# Patient Record
Sex: FEMALE | URBAN - METROPOLITAN AREA
[De-identification: names, ages, dates, MRNs, and addresses within clinical notes are randomized per-mention and may not be internally consistent; named-entity substitution may affect disease eponyms.]

---

## 2024-06-19 ENCOUNTER — MEDICAL CORRESPONDENCE (OUTPATIENT)
Dept: HEALTH INFORMATION MANAGEMENT | Facility: CLINIC | Age: 49
End: 2024-06-19
Payer: COMMERCIAL

## 2024-06-20 ENCOUNTER — TRANSCRIBE ORDERS (OUTPATIENT)
Dept: OTHER | Age: 49
End: 2024-06-20

## 2024-06-20 DIAGNOSIS — K75.4 AUTOIMMUNE HEPATITIS (H): Primary | ICD-10-CM

## 2024-06-24 ENCOUNTER — TELEPHONE (OUTPATIENT)
Dept: GASTROENTEROLOGY | Facility: CLINIC | Age: 49
End: 2024-06-24

## 2024-06-24 ENCOUNTER — OFFICE VISIT (OUTPATIENT)
Dept: GASTROENTEROLOGY | Facility: CLINIC | Age: 49
End: 2024-06-24
Attending: PHYSICIAN ASSISTANT
Payer: COMMERCIAL

## 2024-06-24 VITALS
BODY MASS INDEX: 31.94 KG/M2 | HEART RATE: 79 BPM | OXYGEN SATURATION: 98 % | WEIGHT: 191.7 LBS | DIASTOLIC BLOOD PRESSURE: 78 MMHG | SYSTOLIC BLOOD PRESSURE: 113 MMHG | HEIGHT: 65 IN

## 2024-06-24 DIAGNOSIS — K75.4 AUTOIMMUNE HEPATITIS (H): Primary | ICD-10-CM

## 2024-06-24 DIAGNOSIS — K75.4 AUTOIMMUNE HEPATITIS (H): ICD-10-CM

## 2024-06-24 DIAGNOSIS — R79.89 ELEVATED LFTS: Primary | ICD-10-CM

## 2024-06-24 LAB
ALBUMIN SERPL BCG-MCNC: 4.5 G/DL (ref 3.5–5.2)
ALP SERPL-CCNC: 97 U/L (ref 40–150)
ALT SERPL W P-5'-P-CCNC: 51 U/L (ref 0–50)
AST SERPL W P-5'-P-CCNC: 69 U/L (ref 0–45)
BILIRUB DIRECT SERPL-MCNC: 0.21 MG/DL (ref 0–0.3)
BILIRUB SERPL-MCNC: 0.8 MG/DL
PROT SERPL-MCNC: 8.7 G/DL (ref 6.4–8.3)

## 2024-06-24 PROCEDURE — 80076 HEPATIC FUNCTION PANEL: CPT | Performed by: STUDENT IN AN ORGANIZED HEALTH CARE EDUCATION/TRAINING PROGRAM

## 2024-06-24 PROCEDURE — 99000 SPECIMEN HANDLING OFFICE-LAB: CPT | Performed by: STUDENT IN AN ORGANIZED HEALTH CARE EDUCATION/TRAINING PROGRAM

## 2024-06-24 PROCEDURE — 36415 COLL VENOUS BLD VENIPUNCTURE: CPT | Performed by: STUDENT IN AN ORGANIZED HEALTH CARE EDUCATION/TRAINING PROGRAM

## 2024-06-24 PROCEDURE — 99204 OFFICE O/P NEW MOD 45 MIN: CPT | Performed by: STUDENT IN AN ORGANIZED HEALTH CARE EDUCATION/TRAINING PROGRAM

## 2024-06-24 PROCEDURE — 86364 TISS TRNSGLTMNASE EA IG CLAS: CPT | Performed by: STUDENT IN AN ORGANIZED HEALTH CARE EDUCATION/TRAINING PROGRAM

## 2024-06-24 PROCEDURE — 86790 VIRUS ANTIBODY NOS: CPT | Mod: 90 | Performed by: STUDENT IN AN ORGANIZED HEALTH CARE EDUCATION/TRAINING PROGRAM

## 2024-06-24 RX ORDER — BUDESONIDE 3 MG/1
6 CAPSULE, COATED PELLETS ORAL
COMMUNITY
Start: 2024-06-12 | End: 2024-09-10

## 2024-06-24 RX ORDER — CALCIUM CARBONATE/VITAMIN D3 600 MG-10
1 TABLET ORAL EVERY MORNING
COMMUNITY

## 2024-06-24 RX ORDER — AZATHIOPRINE 50 MG/1
200 TABLET ORAL
COMMUNITY
Start: 2024-04-26

## 2024-06-24 RX ORDER — HYDROXYCHLOROQUINE SULFATE 200 MG/1
400 TABLET, FILM COATED ORAL
COMMUNITY
Start: 2024-06-10 | End: 2024-09-08

## 2024-06-24 RX ORDER — LEVOTHYROXINE SODIUM 100 UG/1
100 TABLET ORAL
COMMUNITY
Start: 2023-09-19 | End: 2024-09-18

## 2024-06-24 ASSESSMENT — PAIN SCALES - GENERAL: PAINLEVEL: NO PAIN (0)

## 2024-06-24 NOTE — PROGRESS NOTES
[facility] New Patient Visit    Date of Visit: June 24, 2024    Reason for referral: autoimmune hepatitis    Subjective: Ms. Sousa is a 49 year old woman with a history of having hepatitis who presents for evaluation of elevated liver enzymes.    In 2011 she presented with jaundice and was found to have significantly elevated liver enzymes.  She had a liver biopsy that showed autoimmune hepatitis intranasal features.  She had 3-4 fibrosis at that time.  She was placed on Imuran and prednisone and reports she was weaned off the prednisone within a year.    She had been doing well until January 2023 when her AST and ALT increased to the 2-3 100s.  She was put on prednisone 40 mg daily and repeat labs in a month later were normal, with the taper she had mild liver enzyme elevation so she was put on budesonide 9 mg daily.  They have not been able to wean her down 6 mg daily.  In May she had metabolites checked and her Imuran was increased to 200 mg daily.  She has not had a repeat biopsy.  Her ultrasounds have been normal without any signs of fibrosis or steatosis.  She does not drink alcohol.  Does not have risk factors for metabolic liver disease.    She has an inflammatory arthritis that she is on Plaquenil for.  She has gained weight on steroids and does not like taking them.    ROS: 14 point ROS negative except for positives noted in HPI.    PMHx:  Autoimmune hepatitis  Inflammatory arthritis    PSHx:  None  Liver Bx    FamHx:  No family history of liver disease, liver cancer  Thyroid issues  Brother with bone marrow issue    SocHx:  Social History     Socioeconomic History    Marital status: Patient Declined     Spouse name: Not on file    Number of children: Not on file    Years of education: Not on file    Highest education level: Not on file   Occupational History    Not on file   Tobacco Use    Smoking status: Every Day     Types: Cigarettes    Smokeless tobacco: Never   Substance and  Sexual Activity    Alcohol use: Not Currently    Drug use: Never    Sexual activity: Not on file   Other Topics Concern    Not on file   Social History Narrative    Not on file     Social Determinants of Health     Financial Resource Strain: Low Risk  (9/12/2023)    Received from [facility]    Overall Financial Resource Strain (CARDIA)     Difficulty of Paying Living Expenses: Not hard at all   Food Insecurity: No Food Insecurity (9/12/2023)    Received from [facility]    Hunger Vital Sign     Worried About Running Out of Food in the Last Year: Never true     Ran Out of Food in the Last Year: Never true   Transportation Needs: No Transportation Needs (9/12/2023)    Received from [facility]    PRAPARE - Transportation     Lack of Transportation (Medical): No     Lack of Transportation (Non-Medical): No   Physical Activity: Sufficiently Active (9/12/2023)    Received from [facility]    Exercise Vital Sign     Days of Exercise per Week: 5 days     Minutes of Exercise per Session: 60 min   Stress: Stress Concern Present (9/12/2023)    Received from [facility]    [employer] [place] [employer] - Occupational Stress Questionnaire     Feeling of Stress : To some extent   Social Connections: Moderately Isolated (9/12/2023)    Received from [facility]    Social Connection and Isolation Panel [NHANES]     Frequency of Communication with Friends and Family: More than three times a week     Frequency of Social Gatherings with Friends and Family: Once a week     Attends Roman [employer] Services: Never     Active Member of Clubs or Organizations: No     Attends Club or Organization Meetings: Never     Marital Status:    Interpersonal Safety: Not At Risk (5/22/2024)    Received from [facility]    Intimate Partner Violence     Are you in a relationship where you are  "physically hurt, threatened and/or made to feel afraid?: No   Housing Stability: Unknown (9/12/2023)    Received from [facility]    Housing Stability Vital Sign     Unable to Pay for Housing in the Last Year: No     Number of Times Moved in the Last Year: Not on file     Homeless in the Last Year: Not on file   Lives at home with  and step kids (14, 18)  Adult children (25, 27)   No alcohol    Medications:   azaTHIOprine (IMURAN) 50 mg oral Tablet Take 3 Tablets (150 mg) by mouth in the morning.    b complex vitamins (VITAMIN B COMPLEX) oral Tablet Take 1 Tablet by mouth in the morning.    budesonide (ENTOCORT EC) 3 mg oral Capsule, Delayed & Ext.Release Take 2 Capsules (6 mg) by mouth in the morning.    calcium carbonate-vitamin D3 (AKA: CALTRATE-600+D) 600 mg(1,500mg) -400 unit oral Tablet Take 1 Tablet by mouth in the morning.    diclofenac sodium (VOLTAREN) 1 % topical Gel 2 g by topical route in the morning and 2 g in the evening. Apply to affected joint..    hydroxychloroquine (PLAQUENIL) 200 mg oral Tablet Take 1 Tablet (200 mg) by mouth in the morning and 1 Tablet (200 mg) in the evening.    levothyroxine (LEVOXYL,SYNTHROID) 100 mcg oral Tablet Take 1 Tablet (100 mcg) by mouth daily before breakfast.    multivitamin therapeutic oral Tablet Take 1 Tablet by mouth in the morning.     No OTCs, herbals    Allergies:  Allergies   Allergen Reactions    Penicillins Rash    Latex Rash       Objective:  /78 (BP Location: Left arm, Patient Position: Sitting, Cuff Size: Adult Regular)   Pulse 79   Ht 1.651 m (5' 5\")   Wt 87 kg (191 lb 11.2 oz)   SpO2 98%   BMI 31.90 kg/m    Constitutional: pleasant woman in NAD  Eyes: non icteric  Respiratory: Normal respiratory excursion   MSK: normal range of motion of visualized extremities  Abd: Non distended  Skin: No jaundice  Psychiatric: normal mood and orientation    Labs:  Last Comprehensive Metabolic Panel:  Bilirubin Total   Date " "Value Ref Range Status   06/24/2024 0.8 <=1.2 mg/dL Final     Alkaline Phosphatase   Date Value Ref Range Status   06/24/2024 97 40 - 150 U/L Final     ALT   Date Value Ref Range Status   06/24/2024 51 (H) 0 - 50 U/L Final     Comment:     Reference intervals for this test were updated on 6/12/2023 to more accurately reflect our healthy population. There may be differences in the flagging of prior results with similar values performed with this method. Interpretation of those prior results can be made in the context of the updated reference intervals.       AST   Date Value Ref Range Status   06/24/2024 69 (H) 0 - 45 U/L Final     Comment:     Reference intervals for this test were updated on 6/12/2023 to more accurately reflect our healthy population. There may be differences in the flagging of prior results with similar values performed with this method. Interpretation of those prior results can be made in the context of the updated reference intervals.       No results found for: \"WBC\"  No results found for: \"RBC\"  No results found for: \"HGB\"  No results found for: \"HCT\"  No results found for: \"MCV\"  No results found for: \"MCH\"  No results found for: \"MCHC\"  No results found for: \"RDW\"  No results found for: \"PLT\"    No results found for: \"INR\"     Computed MELD 3.0 unavailable. One or more values for this score either were not found within the given timeframe or did not fit some other criterion.  Computed MELD-Na unavailable. One or more values for this score either were not found within the given timeframe or did not fit some other criterion.    Liver Bx 2011    DIAGNOSIS:   LIVER, NEEDLE BIOPSY        --SEVERELY ACTIVE (GRADE 4 OF 4) CHRONIC HEPATITIS WITH GIANT          CELL TRANSFORMATION, CHOLESTASIS, PERICELLULAR FIBROSIS AND          BRIDGING FIBROSIS (STAGE 3 OF 4), FAVOR AUTOIMMUNE HEPATITIS          (SEE COMMENT)       Imaging:    RUQ US 6/2024    FINDINGS: Liver is normal in size with no definite " contour abnormality to indicate cirrhosis.   Hepatic echogenicity is normal. No liver mass seen. Portal vein patent with normal direction of    flow.     Normal gallbladder. No gallstones or changes of cholecystitis. Normal bile ducts. Common duct   measures 4 mm.     Normal pancreas.     Normal right kidney.     No adenopathy or free fluid seen in the right upper abdomen.     IMPRESSION:     1.  Negative examination. No evidence of hepatocellular cancer. The liver is not clearly   abnormal.     Independently reviewed labs and imaging.     Assessment/Plan: Ms. Galvez is a 49-year-old with a history of biopsy-proven autoimmune hepatitis with giant cell features, bridging fibrosis in 2011.  She presents for consultation of elevated liver enzymes despite treatment for autoimmune hepatitis.  Recommend ruling out other causes of liver enzyme elevation including celiac disease and hepatitis E given her immunosuppression.  Would recommend she get a repeat liver biopsy, this to be done at [facility].  Based on this may need to switch her to CellCept to try to get her on a steroid sparing regimen.  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  Discussed indications for liver transplant, she does not need a transplant at this time..  Recommend DEXA scan that she can get done at [facility]    Orders Placed This Encounter   Procedures    Hepatitis E Antibody IgM    Hepatic function panel    Tissue transglutaminase leandro IgA and IgG       RTC based on biopsy results    Emy Gabriel MD MS  Hepatology/Liver Transplant  [facility]

## 2024-06-24 NOTE — TELEPHONE ENCOUNTER
Percutaneous liver biopsy and Dexa scan order faxed to [facility].    Elizabeth SAPP,REYNAN  Hepatology Clinic

## 2024-06-24 NOTE — TELEPHONE ENCOUNTER
----- Message from Emy Gabriel sent at 6/24/2024  1:58 PM CDT -----  Hey - can you send an order for percutaneous random liver biopsy at [facility] (indication evaluate autoimmune hepatitis), and als and alsoo DEXA scan?

## 2024-06-25 ENCOUNTER — TELEPHONE (OUTPATIENT)
Dept: GASTROENTEROLOGY | Facility: CLINIC | Age: 49
End: 2024-06-25
Payer: COMMERCIAL

## 2024-06-25 LAB — HEV IGM SER QL: NEGATIVE

## 2024-06-25 NOTE — TELEPHONE ENCOUNTER
Call back to Anali with [facility] imaging. Updated Anali that biopsy is needed based on lab results. Per Anali they have to send imaging as well for the radiologist to review. Patient had an ultrasound done one 6/21 at [facility] they will use.    Elizabeth SAPP LPN  Hepatology Clinic      -------------  [facility] Call Center    Phone Message    May a detailed message be left on voicemail: yes     Reason for Call: Other: Anali at [facility] called to verify what imaging was used to clarify need for liver biopsy.  Please follow up with Anali.     Action Taken: Message routed to:  [facility] Hepatology Adult [facility]    Travel Screening: Not Applicable

## 2024-06-26 LAB
TTG IGA SER-ACNC: 0.7 U/ML
TTG IGG SER-ACNC: <0.6 U/ML

## 2024-07-17 ENCOUNTER — TELEPHONE (OUTPATIENT)
Dept: GASTROENTEROLOGY | Facility: CLINIC | Age: 49
End: 2024-07-17
Payer: COMMERCIAL

## 2024-07-17 DIAGNOSIS — R79.89 ELEVATED LFTS: Primary | ICD-10-CM

## 2024-07-17 NOTE — TELEPHONE ENCOUNTER
Faxed request for liver biopsy slides to [facility] Pathology Department.    Elizabeth SAPP LPN  Hepatology Clinic     ----- Message from Emy Gabriel sent at 7/15/2024  4:30 PM CDT -----  Regarding: RE: liver biopsy was done. No results in as of yet  Can you request her slides be sent here to be read?  ----- Message -----  From: Elizabeth Adams LPN  Sent: 7/12/2024   1:47 PM CDT  To: Emy Gabriel MD  Subject: liver biopsy was done. No results in as of y#

## 2024-07-19 PROCEDURE — 88321 CONSLTJ&REPRT SLD PREP ELSWR: CPT | Performed by: PATHOLOGY

## 2024-07-22 LAB
PATH REPORT.COMMENTS IMP SPEC: NORMAL
PATH REPORT.FINAL DX SPEC: NORMAL
PATH REPORT.GROSS SPEC: NORMAL
PATH REPORT.MICROSCOPIC SPEC OTHER STN: NORMAL
PATH REPORT.RELEVANT HX SPEC: NORMAL
PATH REPORT.RELEVANT HX SPEC: NORMAL
PATH REPORT.SITE OF ORIGIN SPEC: NORMAL

## 2024-07-24 ENCOUNTER — TELEPHONE (OUTPATIENT)
Dept: GASTROENTEROLOGY | Facility: CLINIC | Age: 49
End: 2024-07-24
Payer: COMMERCIAL

## 2024-07-24 DIAGNOSIS — R79.89 ELEVATED LFTS: Primary | ICD-10-CM

## 2024-07-24 RX ORDER — MYCOPHENOLATE MOFETIL 500 MG/1
1000 TABLET, FILM COATED ORAL 2 TIMES DAILY
Qty: 120 TABLET | Refills: 0 | Status: SHIPPED | OUTPATIENT
Start: 2024-07-24 | End: 2024-08-19

## 2024-07-24 NOTE — PROGRESS NOTES
Discussed biopsy results with Ms. Sousa.  Her liver biopsy showed active autoimmune hepatitis, albeit low activity but this was on 200 mg Imuran daily and budesonide 9 mg daily.  Showed improvement in her fibrosis, she does not have any fibrosis.    Discussed options with her.  She would prefer to try different maintenance medication we will change her to CellCept 1000 mg twice daily and continue budesonide 9 mg daily.  She is doing labs next week we will check her labs done.  Discussed people may have GI issues with CellCept, could lower her dose if she does not tolerate this and her liver enzymes are okay

## 2024-07-24 NOTE — TELEPHONE ENCOUNTER
[facility] Call Center    Phone Message    May a detailed message be left on voicemail: yes     Reason for Call: Other: Pt returning a call from the team to discuss her biopsy results. Please reach back out to Pt once available. Thank you!     Action Taken: Message routed to:  [facility] Hepatology    Travel Screening: Not Applicable     Date of Service:

## 2024-08-19 ENCOUNTER — MYC REFILL (OUTPATIENT)
Dept: GASTROENTEROLOGY | Facility: CLINIC | Age: 49
End: 2024-08-19
Payer: COMMERCIAL

## 2024-08-19 DIAGNOSIS — R79.89 ELEVATED LFTS: ICD-10-CM

## 2024-08-19 RX ORDER — MYCOPHENOLATE MOFETIL 500 MG/1
1000 TABLET, FILM COATED ORAL 2 TIMES DAILY
Qty: 120 TABLET | Refills: 0 | Status: SHIPPED | OUTPATIENT
Start: 2024-08-19 | End: 2024-09-12

## 2024-09-06 ENCOUNTER — TELEPHONE (OUTPATIENT)
Dept: GASTROENTEROLOGY | Facility: CLINIC | Age: 49
End: 2024-09-06
Payer: COMMERCIAL

## 2024-09-06 NOTE — TELEPHONE ENCOUNTER
[facility] Call Center    Phone Message    May a detailed message be left on voicemail: yes     Reason for Call: Other: Susan with [facility] in [place] is calling looking to request lab orders be faxed to them at [phone]. Please call back with any questions.     Action Taken: Message routed to:  [facility] Hep    Travel Screening: Not Applicable     Date of Service:

## 2024-09-09 DIAGNOSIS — K75.4 AUTOIMMUNE HEPATITIS (H): ICD-10-CM

## 2024-09-09 DIAGNOSIS — R79.89 ELEVATED LFTS: Primary | ICD-10-CM

## 2024-09-09 NOTE — TELEPHONE ENCOUNTER
Lab order faxed.    ORIANA BarrosN, RN, PHN  Hepatology Clinic  [facility]  [facility]

## 2024-09-12 ENCOUNTER — MYC REFILL (OUTPATIENT)
Dept: GASTROENTEROLOGY | Facility: CLINIC | Age: 49
End: 2024-09-12
Payer: COMMERCIAL

## 2024-09-12 DIAGNOSIS — R79.89 ELEVATED LFTS: ICD-10-CM

## 2024-09-13 RX ORDER — MYCOPHENOLATE MOFETIL 500 MG/1
1000 TABLET, FILM COATED ORAL 2 TIMES DAILY
Qty: 120 TABLET | Refills: 5 | Status: SHIPPED | OUTPATIENT
Start: 2024-09-13

## 2024-09-15 ENCOUNTER — MYC MEDICAL ADVICE (OUTPATIENT)
Dept: GASTROENTEROLOGY | Facility: CLINIC | Age: 49
End: 2024-09-15
Payer: COMMERCIAL

## 2024-09-15 DIAGNOSIS — K75.4 AUTOIMMUNE HEPATITIS (H): Primary | ICD-10-CM

## 2024-09-15 DIAGNOSIS — R79.89 ELEVATED LFTS: ICD-10-CM

## 2024-09-16 RX ORDER — BUDESONIDE 3 MG/1
6 CAPSULE, COATED PELLETS ORAL EVERY MORNING
Qty: 180 CAPSULE | Refills: 1 | Status: SHIPPED | OUTPATIENT
Start: 2024-09-16

## 2024-11-27 ENCOUNTER — MYC MEDICAL ADVICE (OUTPATIENT)
Dept: GASTROENTEROLOGY | Facility: CLINIC | Age: 49
End: 2024-11-27
Payer: COMMERCIAL

## 2024-11-27 DIAGNOSIS — K75.4 AUTOIMMUNE HEPATITIS (H): Primary | ICD-10-CM

## 2024-12-02 ENCOUNTER — TELEPHONE (OUTPATIENT)
Dept: GASTROENTEROLOGY | Facility: CLINIC | Age: 49
End: 2024-12-02
Payer: COMMERCIAL

## 2024-12-02 NOTE — TELEPHONE ENCOUNTER
Left Voicemail (1st Attempt) and Sent Mychart (1st Attempt) for the patient to call back and schedule the following:    Appointment Type: Return Liver  Provider: Dr. Emy Gabriel  Appt date: Approx. Next Available  Specialty phone number: [phone]  Additional appointment(s) needed: Lab  Additional Notes:

## 2024-12-15 ENCOUNTER — HEALTH MAINTENANCE LETTER (OUTPATIENT)
Age: 49
End: 2024-12-15

## 2025-02-26 ENCOUNTER — MYC REFILL (OUTPATIENT)
Dept: GASTROENTEROLOGY | Facility: CLINIC | Age: 50
End: 2025-02-26
Payer: COMMERCIAL

## 2025-02-26 DIAGNOSIS — R79.89 ELEVATED LFTS: ICD-10-CM

## 2025-02-26 RX ORDER — MYCOPHENOLATE MOFETIL 500 MG/1
1000 TABLET, FILM COATED ORAL 2 TIMES DAILY
Qty: 120 TABLET | Refills: 11 | Status: SHIPPED | OUTPATIENT
Start: 2025-02-26

## 2025-05-14 ENCOUNTER — OFFICE VISIT (OUTPATIENT)
Dept: GASTROENTEROLOGY | Facility: CLINIC | Age: 50
End: 2025-05-14
Payer: COMMERCIAL

## 2025-05-14 VITALS
BODY MASS INDEX: 34.46 KG/M2 | DIASTOLIC BLOOD PRESSURE: 71 MMHG | HEART RATE: 76 BPM | SYSTOLIC BLOOD PRESSURE: 104 MMHG | WEIGHT: 207.1 LBS

## 2025-05-14 DIAGNOSIS — D84.9 IMMUNOSUPPRESSION: ICD-10-CM

## 2025-05-14 DIAGNOSIS — R79.89 ELEVATED LFTS: Primary | ICD-10-CM

## 2025-05-14 RX ORDER — MULTIVITAMIN,THERAPEUTIC
1 TABLET ORAL EVERY MORNING
COMMUNITY

## 2025-05-14 RX ORDER — LEVOTHYROXINE SODIUM 125 UG/1
TABLET ORAL
COMMUNITY
Start: 2025-03-01

## 2025-05-14 RX ORDER — HYDROXYCHLOROQUINE SULFATE 200 MG/1
TABLET, FILM COATED ORAL
COMMUNITY

## 2025-05-14 NOTE — LETTER
5/14/2025       RE: Stefany Sousa  [address]  [place] [address]     Dear Colleague,    Thank you for referring your patient, Stefany Sousa, to the [facility] LIVER [facility]INIC [facility]. Please see a copy of my visit note below.    [facility] New Patient Visit    Date of Visit: May 14, 2025    Reason for referral: autoimmune hepatitis    Subjective: Ms. Sousa is a 50 year old woman with a history of autoimmune hepatitis who presents for routine follow-up    Initial History:    In 2011 she presented with jaundice and was found to have significantly elevated liver enzymes.  She had a liver biopsy that showed autoimmune hepatitis intranasal features.  She had 3-4 fibrosis at that time.  She was placed on Imuran and prednisone and reports she was weaned off the prednisone within a year.    She had been doing well until January 2023 when her AST and ALT increased to the 2-3 100s.  She was put on prednisone 40 mg daily and repeat labs in a month later were normal, with the taper she had mild liver enzyme elevation so she was put on budesonide 9 mg daily.  They have not been able to wean her down 6 mg daily.  In May she had metabolites checked and her Imuran was increased to 200 mg daily.  She has not had a repeat biopsy.  Her ultrasounds have been normal without any signs of fibrosis or steatosis.  She does not drink alcohol.  Does not have risk factors for metabolic liver disease.    She has an inflammatory arthritis that she is on Plaquenil for.  She has gained weight on steroids and does not like taking them.    Interval Events:  We have tried weaning her budesonide but her liver enzymes thiago.  2 weeks ago we increased her CellCept to 2500 mg daily total.  She otherwise feels okay.  She does not feel great on budesonide or prednisone    ROS: 14 point ROS negative except for positives noted in HPI.    PMHx:  Autoimmune  hepatitis  Inflammatory arthritis    PSHx:  None  Liver Bx    FamHx:  No family history of liver disease, liver cancer  Thyroid issues  Brother with bone marrow issue    SocHx:  Social History     Socioeconomic History     Marital status: Patient Declined     Spouse name: Not on file     Number of children: Not on file     Years of education: Not on file     Highest education level: Not on file   Occupational History     Not on file   Tobacco Use     Smoking status: Every Day     Types: Cigarettes     Smokeless tobacco: Never   Substance and Sexual Activity     Alcohol use: Not Currently     Drug use: Never     Sexual activity: Not on file   Other Topics Concern     Not on file   Social History Narrative     Not on file     Social Drivers of Health     Financial Resource Strain: Low Risk  (12/13/2024)    [facility]    Overall Financial Resource Strain (CARDIA)      Difficulty of Paying Living Expenses: Not hard at all   Food Insecurity: No Food Insecurity (12/13/2024)    [facility]    Hunger Vital Sign      Worried About Running Out of Food in the Last Year: Never true      Ran Out of Food in the Last Year: Never true   Transportation Needs: No Transportation Needs (12/13/2024)    [facility]    Transportation Needs      In the past 12 months, has lack of transportation kept you from medical appointments, meetings, work, or from getting medicines or things needed for daily living?: No   Physical Activity: Sufficiently Active (12/13/2024)    Received from [facility]    Exercise Vital Sign      Days of Exercise per Week: 5 days      Minutes of Exercise per Session: 150+ min   Stress: Stress Concern Present (12/13/2024)    Received from [facility]    [place] [employer] - Occupational Stress Questionnaire      Feeling of Stress : Rather much   Social  Connections: Moderately Isolated (12/13/2024)    Received from [facility]    Social Connection and Isolation Panel [NHANES]      Frequency of Communication with Friends and Family: More than three times a week      Frequency of Social Gatherings with Friends and Family: More than three times a week      Attends Adventist Services: Never      Active Member of Clubs or Organizations: No      Attends Club or Organization Meetings: Never      Marital Status:    Interpersonal Safety: Not At Risk (12/13/2024)    [facility]    Humiliation, Afraid, Rape, and Kick questionnaire      Fear of Current or Ex-Partner: No      Emotionally Abused: No      Physically Abused: No      Sexually Abused: No   Housing Stability: Low Risk  (12/13/2024)    Received from [facility]    Housing Stability Vital Sign      Unable to Pay for Housing in the Last Year: No      Number of Times Moved in the Last Year: 0      Homeless in the Last Year: No   Lives at home with  and step kids (14, 18)  Adult children (25, 27)   No alcohol    Medications:    azaTHIOprine (IMURAN) 50 mg oral Tablet Take 3 Tablets (150 mg) by mouth in the morning.     b complex vitamins (VITAMIN B COMPLEX) oral Tablet Take 1 Tablet by mouth in the morning.     budesonide (ENTOCORT EC) 3 mg oral Capsule, Delayed & Ext.Release Take 2 Capsules (6 mg) by mouth in the morning.     calcium carbonate-vitamin D3 (AKA: CALTRATE-600+D) 600 mg(1,500mg) -400 unit oral Tablet Take 1 Tablet by mouth in the morning.     diclofenac sodium (VOLTAREN) 1 % topical Gel 2 g by topical route in the morning and 2 g in the evening. Apply to affected joint..     hydroxychloroquine (PLAQUENIL) 200 mg oral Tablet Take 1 Tablet (200 mg) by mouth in the morning and 1 Tablet (200 mg) in the evening.     levothyroxine (LEVOXYL,SYNTHROID) 100 mcg oral Tablet Take 1 Tablet (100 mcg) by mouth daily before  "breakfast.     multivitamin therapeutic oral Tablet Take 1 Tablet by mouth in the morning.     No OTCs, herbals    Allergies:  Allergies   Allergen Reactions     Penicillins Rash     Latex Rash       Objective:  /71 (BP Location: Right arm, Patient Position: Sitting, Cuff Size: Adult Large)   Pulse 76   Wt 93.9 kg (207 lb 1.6 oz)   BMI 34.46 kg/m    Constitutional: pleasant woman in NAD  Eyes: non icteric  Respiratory: Normal respiratory excursion   MSK: normal range of motion of visualized extremities  Abd: Non distended  Skin: No jaundice  Psychiatric: normal mood and orientation    Labs:  Last Comprehensive Metabolic Panel:  Bilirubin Total   Date Value Ref Range Status   06/24/2024 0.8 <=1.2 mg/dL Final     Alkaline Phosphatase   Date Value Ref Range Status   06/24/2024 97 40 - 150 U/L Final     ALT   Date Value Ref Range Status   06/24/2024 51 (H) 0 - 50 U/L Final     Comment:     Reference intervals for this test were updated on 6/12/2023 to more accurately reflect our healthy population. There may be differences in the flagging of prior results with similar values performed with this method. Interpretation of those prior results can be made in the context of the updated reference intervals.       AST   Date Value Ref Range Status   06/24/2024 69 (H) 0 - 45 U/L Final     Comment:     Reference intervals for this test were updated on 6/12/2023 to more accurately reflect our healthy population. There may be differences in the flagging of prior results with similar values performed with this method. Interpretation of those prior results can be made in the context of the updated reference intervals.       No results found for: \"WBC\"  No results found for: \"RBC\"  No results found for: \"HGB\"  No results found for: \"HCT\"  No results found for: \"MCV\"  No results found for: \"MCH\"  No results found for: \"MCHC\"  No results found for: \"RDW\"  No results found for: \"PLT\"    No results found for: \"INR\"     Computed " MELD 3.0 unavailable. One or more values for this score either were not found within the given timeframe or did not fit some other criterion.  Computed MELD-Na unavailable. One or more values for this score either were not found within the given timeframe or did not fit some other criterion.    Liver Bx 2011    DIAGNOSIS:   LIVER, NEEDLE BIOPSY        --SEVERELY ACTIVE (GRADE 4 OF 4) CHRONIC HEPATITIS WITH GIANT          CELL TRANSFORMATION, CHOLESTASIS, PERICELLULAR FIBROSIS AND          BRIDGING FIBROSIS (STAGE 3 OF 4), FAVOR AUTOIMMUNE HEPATITIS          (SEE COMMENT)       Imaging:    RUQ US 6/2024    FINDINGS: Liver is normal in size with no definite contour abnormality to indicate cirrhosis.   Hepatic echogenicity is normal. No liver mass seen. Portal vein patent with normal direction of    flow.     Normal gallbladder. No gallstones or changes of cholecystitis. Normal bile ducts. Common duct   measures 4 mm.     Normal pancreas.     Normal right kidney.     No adenopathy or free fluid seen in the right upper abdomen.     IMPRESSION:     1.  Negative examination. No evidence of hepatocellular cancer. The liver is not clearly   abnormal.     Independently reviewed labs and imaging.     Assessment/Plan: Ms. Galvez is a 50-year-old with a history of biopsy-proven autoimmune hepatitis with giant cell features, bridging fibrosis in 2011.  We got her because she had continued elevated liver enzymes despite Imuran.  We had a repeat liver biopsy July 2024 that showed       LIVER, CORE BIOPSY  --MODERATE PORTAL BASED CHRONIC INFLAMMATION WITH PLASMA CELLS AND RARE EOSINOPHILS AND NO FIBROSIS, SEE COMMENT  --NO STEATOSIS  --NO IRON ACCUMULATION     Based on this we switched her to CellCept.   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  We tried to wean her budesonide but her liver enzymes thiago so recently we increased her total CellCept dose.  She is  not having pancytopenia or side effects of CellCept.    Liver enzymes are slightly elevated today, would continue CellCept at current dose and repeat in 2 weeks.  If they are still elevated may need to start low-dose budesonide.    DEXA scan 8/2/2024 with osteopenia    Refer to dermatology for regular skin exams    No orders of the defined types were placed in this encounter.      RTC 12 months    Emy Gabriel MD MS  Hepatology/Liver Transplant  [facility]    The longitudinal plan of care for the diagnosis(es)/condition(s) as documented were addressed during this visit. Due to the added complexity in care, I will continue to support Stefany in the subsequent management and with ongoing continuity of care.      Again, thank you for allowing me to participate in the care of your patient.      Sincerely,    Emy Gabriel MD

## 2025-05-14 NOTE — PROGRESS NOTES
[facility] Liver Clinic New Patient Visit    Date of Visit: May 14, 2025    Reason for referral: autoimmune hepatitis    Subjective: Ms. Sousa is a 50 year old woman with a history of autoimmune hepatitis who presents for routine follow-up    Initial History:    In 2011 she presented with jaundice and was found to have significantly elevated liver enzymes.  She had a liver biopsy that showed autoimmune hepatitis intranasal features.  She had 3-4 fibrosis at that time.  She was placed on Imuran and prednisone and reports she was weaned off the prednisone within a year.    She had been doing well until January 2023 when her AST and ALT increased to the 2-3 100s.  She was put on prednisone 40 mg daily and repeat labs in a month later were normal, with the taper she had mild liver enzyme elevation so she was put on budesonide 9 mg daily.  They have not been able to wean her down 6 mg daily.  In May she had metabolites checked and her Imuran was increased to 200 mg daily.  She has not had a repeat biopsy.  Her ultrasounds have been normal without any signs of fibrosis or steatosis.  She does not drink alcohol.  Does not have risk factors for metabolic liver disease.    She has an inflammatory arthritis that she is on Plaquenil for.  She has gained weight on steroids and does not like taking them.    Interval Events:  We have tried weaning her budesonide but her liver enzymes thiago.  2 weeks ago we increased her CellCept to 2500 mg daily total.  She otherwise feels okay.  She does not feel great on budesonide or prednisone    ROS: 14 point ROS negative except for positives noted in HPI.    PMHx:  Autoimmune hepatitis  Inflammatory arthritis    PSHx:  None  Liver Bx    FamHx:  No family history of liver disease, liver cancer  Thyroid issues  Brother with bone marrow issue    SocHx:  Social History     Socioeconomic History    Marital status: Patient Declined     Spouse name: Not on file    Number of children:  Not on file    Years of education: Not on file    Highest education level: Not on file   Occupational History    Not on file   Tobacco Use    Smoking status: Every Day     Types: Cigarettes    Smokeless tobacco: Never   Substance and Sexual Activity    Alcohol use: Not Currently    Drug use: Never    Sexual activity: Not on file   Other Topics Concern    Not on file   Social History Narrative    Not on file     Social Drivers of Health     Financial Resource Strain: Low Risk  (12/13/2024)    [facility] [employer] [facility]    Overall Financial Resource Strain (CARDIA)     Difficulty of Paying Living Expenses: Not hard at all   Food Insecurity: No Food Insecurity (12/13/2024)    Received from [facility] [employer] [facility]    Hunger Vital Sign     Worried About Running Out of Food in the Last Year: Never true     Ran Out of Food in the Last Year: Never true   Transportation Needs: No Transportation Needs (12/13/2024)    Received from [facility] [employer] [facility]    Transportation Needs     In the past 12 months, has lack of transportation kept you from medical appointments, meetings, work, or from getting medicines or things needed for daily living?: No   Physical Activity: Sufficiently Active (12/13/2024)    Received from [facility] [employer] [facility]    Exercise Vital Sign     Days of Exercise per Week: 5 days     Minutes of Exercise per Session: 150+ min   Stress: Stress Concern Present (12/13/2024)    Received from [facility] [employer] [facility]    [place] [employer] of Occupational Health - Occupational Stress Questionnaire     Feeling of Stress : Rather much   Social Connections: Moderately Isolated (12/13/2024)    Received from [facility] [employer] [facility]    Social Connection and Isolation Panel [NHANES]     Frequency of Communication with Friends and Family: More than three times a week     Frequency of Social Gatherings with Friends and Family: More than three times a week      Attends Christian Services: Never     Active Member of Clubs or Organizations: No     Attends Club or Organization Meetings: Never     Marital Status:    Interpersonal Safety: Not At Risk (12/13/2024)    [facility]    Humiliation, Afraid, Rape, and Kick questionnaire     Fear of Current or Ex-Partner: No     Emotionally Abused: No     Physically Abused: No     Sexually Abused: No   Housing Stability: Low Risk  (12/13/2024)    [facility]    Housing Stability Vital Sign     Unable to Pay for Housing in the Last Year: No     Number of Times Moved in the Last Year: 0     Homeless in the Last Year: No   Lives at home with  and step kids (14, 18)  Adult children (25, 27)   No alcohol    Medications:   azaTHIOprine (IMURAN) 50 mg oral Tablet Take 3 Tablets (150 mg) by mouth in the morning.    b complex vitamins (VITAMIN B COMPLEX) oral Tablet Take 1 Tablet by mouth in the morning.    budesonide (ENTOCORT EC) 3 mg oral Capsule, Delayed & Ext.Release Take 2 Capsules (6 mg) by mouth in the morning.    calcium carbonate-vitamin D3 (AKA: CALTRATE-600+D) 600 mg(1,500mg) -400 unit oral Tablet Take 1 Tablet by mouth in the morning.    diclofenac sodium (VOLTAREN) 1 % topical Gel 2 g by topical route in the morning and 2 g in the evening. Apply to affected joint..    hydroxychloroquine (PLAQUENIL) 200 mg oral Tablet Take 1 Tablet (200 mg) by mouth in the morning and 1 Tablet (200 mg) in the evening.    levothyroxine (LEVOXYL,SYNTHROID) 100 mcg oral Tablet Take 1 Tablet (100 mcg) by mouth daily before breakfast.    multivitamin therapeutic oral Tablet Take 1 Tablet by mouth in the morning.     No OTCs, herbals    Allergies:  Allergies   Allergen Reactions    Penicillins Rash    Latex Rash       Objective:  /71 (BP Location: Right arm, Patient Position: Sitting, Cuff Size: Adult Large)   Pulse 76   Wt 93.9 kg (207 lb 1.6 oz)   BMI 34.46  "kg/m    Constitutional: pleasant woman in NAD  Eyes: non icteric  Respiratory: Normal respiratory excursion   MSK: normal range of motion of visualized extremities  Abd: Non distended  Skin: No jaundice  Psychiatric: normal mood and orientation    Labs:  Last Comprehensive Metabolic Panel:  Bilirubin Total   Date Value Ref Range Status   06/24/2024 0.8 <=1.2 mg/dL Final     Alkaline Phosphatase   Date Value Ref Range Status   06/24/2024 97 40 - 150 U/L Final     ALT   Date Value Ref Range Status   06/24/2024 51 (H) 0 - 50 U/L Final     Comment:     Reference intervals for this test were updated on 6/12/2023 to more accurately reflect our healthy population. There may be differences in the flagging of prior results with similar values performed with this method. Interpretation of those prior results can be made in the context of the updated reference intervals.       AST   Date Value Ref Range Status   06/24/2024 69 (H) 0 - 45 U/L Final     Comment:     Reference intervals for this test were updated on 6/12/2023 to more accurately reflect our healthy population. There may be differences in the flagging of prior results with similar values performed with this method. Interpretation of those prior results can be made in the context of the updated reference intervals.       No results found for: \"WBC\"  No results found for: \"RBC\"  No results found for: \"HGB\"  No results found for: \"HCT\"  No results found for: \"MCV\"  No results found for: \"MCH\"  No results found for: \"MCHC\"  No results found for: \"RDW\"  No results found for: \"PLT\"    No results found for: \"INR\"     Computed MELD 3.0 unavailable. One or more values for this score either were not found within the given timeframe or did not fit some other criterion.  Computed MELD-Na unavailable. One or more values for this score either were not found within the given timeframe or did not fit some other criterion.    Liver Bx 2011    DIAGNOSIS:   LIVER, NEEDLE BIOPSY        " --SEVERELY ACTIVE (GRADE 4 OF 4) CHRONIC HEPATITIS WITH GIANT          CELL TRANSFORMATION, CHOLESTASIS, PERICELLULAR FIBROSIS AND          BRIDGING FIBROSIS (STAGE 3 OF 4), FAVOR AUTOIMMUNE HEPATITIS          (SEE COMMENT)       Imaging:    RUQ US 6/2024    FINDINGS: Liver is normal in size with no definite contour abnormality to indicate cirrhosis.   Hepatic echogenicity is normal. No liver mass seen. Portal vein patent with normal direction of    flow.     Normal gallbladder. No gallstones or changes of cholecystitis. Normal bile ducts. Common duct   measures 4 mm.     Normal pancreas.     Normal right kidney.     No adenopathy or free fluid seen in the right upper abdomen.     IMPRESSION:     1.  Negative examination. No evidence of hepatocellular cancer. The liver is not clearly   abnormal.     Independently reviewed labs and imaging.     Assessment/Plan: Ms. Galvez is a 50-year-old with a history of biopsy-proven autoimmune hepatitis with giant cell features, bridging fibrosis in 2011.  We got her because she had continued elevated liver enzymes despite Imuran.  We had a repeat liver biopsy July 2024 that showed       LIVER, CORE BIOPSY  --MODERATE PORTAL BASED CHRONIC INFLAMMATION WITH PLASMA CELLS AND RARE EOSINOPHILS AND NO FIBROSIS, SEE COMMENT  --NO STEATOSIS  --NO IRON ACCUMULATION     Based on this we switched her to CellCept.   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  We tried to wean her budesonide but her liver enzymes thiago so recently we increased her total CellCept dose.  She is not having pancytopenia or side effects of CellCept.    Liver enzymes are slightly elevated today, would continue CellCept at current dose and repeat in 2 weeks.  If they are still elevated may need to start low-dose budesonide.    DEXA scan 8/2/2024 with osteopenia    Refer to dermatology for regular skin exams    No orders of the defined types were  placed in this encounter.      RTC 12 months    Emy Gabriel MD MS  Hepatology/Liver Transplant  [facility]    The longitudinal plan of care for the diagnosis(es)/condition(s) as documented were addressed during this visit. Due to the added complexity in care, I will continue to support Stefany in the subsequent management and with ongoing continuity of care.

## 2025-05-19 ENCOUNTER — PATIENT OUTREACH (OUTPATIENT)
Dept: CARE COORDINATION | Facility: CLINIC | Age: 50
End: 2025-05-19
Payer: COMMERCIAL

## 2025-05-26 ENCOUNTER — MYC MEDICAL ADVICE (OUTPATIENT)
Dept: GASTROENTEROLOGY | Facility: CLINIC | Age: 50
End: 2025-05-26
Payer: COMMERCIAL

## 2025-05-26 DIAGNOSIS — R79.89 ELEVATED LFTS: ICD-10-CM

## 2025-05-27 RX ORDER — MYCOPHENOLATE MOFETIL 500 MG/1
TABLET, FILM COATED ORAL
Qty: 150 TABLET | Refills: 11 | Status: SHIPPED | OUTPATIENT
Start: 2025-05-27

## 2025-06-17 ENCOUNTER — MYC MEDICAL ADVICE (OUTPATIENT)
Dept: GASTROENTEROLOGY | Facility: CLINIC | Age: 50
End: 2025-06-17
Payer: COMMERCIAL

## 2025-06-17 NOTE — TELEPHONE ENCOUNTER
Sent [facility] message updating the patient.    Elizabeth SAPP LPN  Hepatology Clinic     ----- Message from KIRK LOPEZ sent at 6/17/2025  1:44 PM CDT -----  Regarding: RE: New lab results in epic  LT back down.   Repeat in 2-4 weeks  Can hold off on any change in therapy for now  Thanks  ----- Message -----  From: Elizabeth Adams LPN  Sent: 6/16/2025  11:11 AM CDT  To: Emy Gabriel MD  Subject: New lab results in epic

## 2025-07-31 ENCOUNTER — TELEPHONE (OUTPATIENT)
Dept: GASTROENTEROLOGY | Facility: CLINIC | Age: 50
End: 2025-07-31
Payer: COMMERCIAL

## 2025-07-31 NOTE — TELEPHONE ENCOUNTER
Message sent to the patient regarding lab results and reply from Dr. Gabriel. Writer will call if message not read.    Elizabeth SAPP LPN  Hepatology Clinic         ----- Message from Emy Gabriel sent at 7/31/2025  1:35 PM CDT -----  Regarding: RE: New lab results  AST only minimally elevated - can you have her repeat LFTs 1 month?  ----- Message -----  From: Elizabeth Adams LPN  Sent: 7/31/2025  10:27 AM CDT  To: Emy Gabriel MD  Subject: New lab results